# Patient Record
(demographics unavailable — no encounter records)

---
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
## 2025-01-23 NOTE — PROCEDURE
[de-identified] : Left long arm cast bivalve procedure performed at bedside. The cast was overwrapped with ACE bandage.  Tolerated well without complication.

## 2025-01-23 NOTE — REVIEW OF SYSTEMS
[Change in Activity] : change in activity [Fever Above 102] : no fever [Malaise] : no malaise [Rash] : no rash [Sore Throat] : no sore throat [Murmur] : no murmur [Cough] : no cough

## 2025-01-23 NOTE — PROCEDURE
[de-identified] : Left long arm cast bivalve procedure performed at bedside. The cast was overwrapped with ACE bandage.  Tolerated well without complication.

## 2025-01-23 NOTE — DATA REVIEWED
[de-identified] : My interpretation and review of images taken today, 01/22/2025, in office:  Left forearm XR in cast today demonstrate midshaft radius and ulnar fractures with minimal displacement on AP views. Lateral view demonstrates about 16 degrees of angulation, apex volar. Physes are patent and unharmed.

## 2025-01-23 NOTE — PHYSICAL EXAM
[FreeTextEntry1] : Healthy appearing 11 year-old child. Awake, alert, in no acute distress. Pleasant and cooperative.  Eyes are clear with no sclera abnormalities. External ears, nose and mouth are clear.  Good respiratory effort with no audible wheezing without use of a stethoscope. Ambulates independently with no evidence of antalgia. Good coordination and balance. Able to get on and off exam table without difficulty.   Left upper extremity in molded LAC Cast is clean and intact.  Skin at cast edges is clean. No abrasions or swelling at cast edges.  Actively wiggling all digits, NVI r/u/m/ain SILT. Brisk capillary refill in all digits.

## 2025-01-23 NOTE — DATA REVIEWED
[de-identified] : My interpretation and review of images taken today, 01/22/2025, in office:  Left forearm XR in cast today demonstrate midshaft radius and ulnar fractures with minimal displacement on AP views. Lateral view demonstrates about 16 degrees of angulation, apex volar. Physes are patent and unharmed.

## 2025-01-23 NOTE — END OF VISIT
[FreeTextEntry3] : A physician assistant/resident assisted with documenting the visit and acted as a scribe. I have seen and examined the patient, made my assessment and plan and have made all modifications necessary to the note.  Lita Holliday MD Pediatric Orthopaedics Surgery [facility]  [Time Spent: ___ minutes] : I have spent [unfilled] minutes of time on the encounter which excludes teaching and separately reported services.
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
## 2025-01-23 NOTE — END OF VISIT
[FreeTextEntry3] : A physician assistant/resident assisted with documenting the visit and acted as a scribe. I have seen and examined the patient, made my assessment and plan and have made all modifications necessary to the note.  Lita Holliday MD Pediatric Orthopaedics Surgery [facility]  [Time Spent: ___ minutes] : I have spent [unfilled] minutes of time on the encounter which excludes teaching and separately reported services. denies pain/discomfort